# Patient Record
Sex: MALE | Race: WHITE | NOT HISPANIC OR LATINO | Employment: FULL TIME | URBAN - METROPOLITAN AREA
[De-identification: names, ages, dates, MRNs, and addresses within clinical notes are randomized per-mention and may not be internally consistent; named-entity substitution may affect disease eponyms.]

---

## 2018-07-05 NOTE — PROGRESS NOTES
FAMILY MEDICINE NEW PATIENT NOTE  Elvis Barrett 33 y o  male   DATE: July 6, 2018      ASSESSMENT and PLAN:  Elvis Barrett is a 33 y o  male here to establish care with:     Trigger ring finger of right hand  Takes Aleve almost every morning for the pain as soon as he wakes up  Related to using a hand tool  patient states that he does not want a steroid injection or surgery at this time, reviewed signs or symptoms for which to seek urgent care    Attention deficit hyperactivity disorder (ADHD), predominantly inattentive type    Patient states that he has had ADHD since he was a child, but never been on treatment for it because his father never wanted him to be medicated  Patient describes symptoms of inattention mostly  Patient given ADHD adult self report scale to fill out and will review at follow-up   Given that patient also expressed interest in medical marijuana and named Adderall and Ritalin as potential medication choices, will review on line PDMP before  Prescribing stimulant medications    Tobacco use  Discussed with patient the risks of continued smoking  Discussed current use pattern  I advised patient to quit, and offered support  Pt expressed understanding, is pre-contemplative at this time  Asked patient to inform me when he sets a quit date  Offered assistance for whenever he is ready to quit  I spent approximately 5 minutes counseling the patient  Chronic fatigue   Patient describes daily fatigue and lack of energy    Per his girlfriend's recommendation, patient has been doing vitamin B12 supplements unsure if he had a known deficiency in the past     Check routine blood work, CBC, CMP, TSH, vitamin B12    Vitamin B 12 deficiency   Patient unsure if he had vitamin B12 deficiency in the past, is taking oral supplementation, will check CBC and vitamin B12     return to clinic in 2-4 weeks to discuss self report scale results and blood work results    SUBJECTIVE:  Yumiko Francisco Aly Kohler is a 33 y o  male who presents today with a chief complaint of Establish Care  Pt here to establish care  Social History: Fiance with a 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 boy  Recently moved here from [facility]  Now working with a [employer] and service [employer], 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     Acute Concerns:  1  Pt has known ADHD and had to visit ERs multiple times  His dad never wanted him to be on medications  First diagnosed around [age] yoa  Symptoms- inattention, wandering thoughts, multiple tasks  Easily distracted  Had tried an herbal drink for a month but without much benefit  Though he may have needed Adderall  Thinks the symptoms are worse at home, but present at both home at work  2  Fatigue- feels fatigued all day, takes Vit B12 supplements at the recommendation of his fiance  3  Tobacco use- smokes 3/4 ppd, has smoked on and off for 13 years, was able to quit for a 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  Currently rolling his own cigarettes, thinks he may even be down to 1/2ppd  4  Thinks he may have IBS- denies changes in his bowel habits, but says he occ gets abdominal cramping  His friend has a medical marijuana card for IBS and was wondering about that  TDaP in 2009 per pt      Review of Systems   Constitutional: Positive for fatigue  Negative for chills and fever  HENT: Negative for hearing loss  Eyes: Negative for visual disturbance  Respiratory: Negative for cough and shortness of breath  Cardiovascular: Negative for chest pain  Gastrointestinal: Negative for blood in stool, constipation, diarrhea, nausea and vomiting  Musculoskeletal: Positive for arthralgias  Skin: Negative for rash  Allergic/Immunologic: Negative for environmental allergies  Psychiatric/Behavioral: Positive for decreased concentration  I have reviewed the patient's PMH, Surgical History, Family History, Social History, Medication List and Allergies        Histories Reviewed and Updated 7/6/2018:  Patient's Medications   New Prescriptions    No medications on file   Previous Medications    NAPROXEN SODIUM (ALEVE PO)    Take 1 tablet by mouth as needed   Modified Medications    No medications on file   Discontinued Medications    No medications on file     No Known Allergies  Past Medical History:   Diagnosis Date    Trigger finger      Past Surgical History:   Procedure Laterality Date    FINGER SURGERY Left      Social History     Social History    Marital status: Single     Spouse name: N/A    Number of children: N/A    Years of education: N/A     Occupational History    Not on file  Social History Main Topics    Smoking status: Current Every Day Smoker     Packs/day: 0 50     Years: 13 00    Smokeless tobacco: Never Used    Alcohol use Yes      Comment: social    Drug use: No    Sexual activity: Yes     Partners: Female     Birth control/ protection: None      Comment: fiance     Other Topics Concern    Not on file     Social History Narrative    Fiance with a 1year old boy    Recently moved here from [facility]  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[employer], 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      Family History   Problem Relation Age of Onset    ALS Mother     Hypertension Father        There is no immunization history on file for this patient  OBJECTIVE:  /56   Pulse 62   Temp (!) 96 1 °F (35 6 °C)   Resp 16   Ht 5' 9 2" (1 758 m)   Wt 78 kg (172 lb)   BMI 25 25 kg/m²   Physical Exam   Constitutional: He is oriented to person, place, and time  He appears well-developed and well-nourished  No distress  HENT:   Head: Normocephalic and atraumatic  Mouth/Throat: Oropharynx is clear and moist  No oropharyngeal exudate  Eyes: Conjunctivae are normal  Pupils are equal, round, and reactive to light  Right eye exhibits no discharge  Left eye exhibits no discharge  Neck: Normal range of motion  Neck supple     Cardiovascular: Normal rate, regular rhythm and normal heart sounds  Pulmonary/Chest: Effort normal and breath sounds normal  No respiratory distress  He has no wheezes  He has no rales  Abdominal: Soft  Bowel sounds are normal  He exhibits no distension  There is no tenderness  Lymphadenopathy:     He has no cervical adenopathy  Neurological: He is alert and oriented to person, place, and time  Skin: He is not diaphoretic  Multiple tattoos on b/l UE   Vitals reviewed  Pancho Diaz MD

## 2018-07-06 ENCOUNTER — APPOINTMENT (OUTPATIENT)
Dept: LAB | Age: 28
End: 2018-07-06
Payer: COMMERCIAL

## 2018-07-06 ENCOUNTER — OFFICE VISIT (OUTPATIENT)
Dept: FAMILY MEDICINE CLINIC | Facility: CLINIC | Age: 28
End: 2018-07-06
Payer: COMMERCIAL

## 2018-07-06 VITALS
DIASTOLIC BLOOD PRESSURE: 56 MMHG | HEIGHT: 69 IN | RESPIRATION RATE: 16 BRPM | BODY MASS INDEX: 25.48 KG/M2 | TEMPERATURE: 96.1 F | WEIGHT: 172 LBS | SYSTOLIC BLOOD PRESSURE: 102 MMHG | HEART RATE: 62 BPM

## 2018-07-06 DIAGNOSIS — M65.341 TRIGGER RING FINGER OF RIGHT HAND: ICD-10-CM

## 2018-07-06 DIAGNOSIS — R53.82 CHRONIC FATIGUE: ICD-10-CM

## 2018-07-06 DIAGNOSIS — Z76.89 ENCOUNTER TO ESTABLISH CARE WITH NEW DOCTOR: Primary | ICD-10-CM

## 2018-07-06 DIAGNOSIS — E53.8 VITAMIN B 12 DEFICIENCY: ICD-10-CM

## 2018-07-06 DIAGNOSIS — F90.0 ATTENTION DEFICIT HYPERACTIVITY DISORDER (ADHD), PREDOMINANTLY INATTENTIVE TYPE: ICD-10-CM

## 2018-07-06 DIAGNOSIS — Z72.0 TOBACCO USE: ICD-10-CM

## 2018-07-06 LAB
ALBUMIN SERPL BCP-MCNC: 4.5 G/DL (ref 3.5–5)
ALP SERPL-CCNC: 50 U/L (ref 46–116)
ALT SERPL W P-5'-P-CCNC: 26 U/L (ref 12–78)
ANION GAP SERPL CALCULATED.3IONS-SCNC: 6 MMOL/L (ref 4–13)
AST SERPL W P-5'-P-CCNC: 14 U/L (ref 5–45)
BASOPHILS # BLD AUTO: 0.09 THOUSANDS/ΜL (ref 0–0.1)
BASOPHILS NFR BLD AUTO: 1 % (ref 0–1)
BILIRUB SERPL-MCNC: 0.72 MG/DL (ref 0.2–1)
BUN SERPL-MCNC: 17 MG/DL (ref 5–25)
CALCIUM SERPL-MCNC: 9 MG/DL (ref 8.3–10.1)
CHLORIDE SERPL-SCNC: 108 MMOL/L (ref 100–108)
CO2 SERPL-SCNC: 26 MMOL/L (ref 21–32)
CREAT SERPL-MCNC: 0.9 MG/DL (ref 0.6–1.3)
EOSINOPHIL # BLD AUTO: 0.34 THOUSAND/ΜL (ref 0–0.61)
EOSINOPHIL NFR BLD AUTO: 4 % (ref 0–6)
ERYTHROCYTE [DISTWIDTH] IN BLOOD BY AUTOMATED COUNT: 12.1 % (ref 11.6–15.1)
GFR SERPL CREATININE-BSD FRML MDRD: 117 ML/MIN/1.73SQ M
GLUCOSE SERPL-MCNC: 124 MG/DL (ref 65–140)
HCT VFR BLD AUTO: 43 % (ref 36.5–49.3)
HGB BLD-MCNC: 14.5 G/DL (ref 12–17)
IMM GRANULOCYTES # BLD AUTO: 0.04 THOUSAND/UL (ref 0–0.2)
IMM GRANULOCYTES NFR BLD AUTO: 0 % (ref 0–2)
LYMPHOCYTES # BLD AUTO: 2.8 THOUSANDS/ΜL (ref 0.6–4.47)
LYMPHOCYTES NFR BLD AUTO: 28 % (ref 14–44)
MCH RBC QN AUTO: 29.3 PG (ref 26.8–34.3)
MCHC RBC AUTO-ENTMCNC: 33.7 G/DL (ref 31.4–37.4)
MCV RBC AUTO: 87 FL (ref 82–98)
MONOCYTES # BLD AUTO: 1.05 THOUSAND/ΜL (ref 0.17–1.22)
MONOCYTES NFR BLD AUTO: 11 % (ref 4–12)
NEUTROPHILS # BLD AUTO: 5.53 THOUSANDS/ΜL (ref 1.85–7.62)
NEUTS SEG NFR BLD AUTO: 56 % (ref 43–75)
NRBC BLD AUTO-RTO: 0 /100 WBCS
PLATELET # BLD AUTO: 241 THOUSANDS/UL (ref 149–390)
PMV BLD AUTO: 9.9 FL (ref 8.9–12.7)
POTASSIUM SERPL-SCNC: 3.7 MMOL/L (ref 3.5–5.3)
PROT SERPL-MCNC: 7.4 G/DL (ref 6.4–8.2)
RBC # BLD AUTO: 4.95 MILLION/UL (ref 3.88–5.62)
SODIUM SERPL-SCNC: 140 MMOL/L (ref 136–145)
TSH SERPL DL<=0.05 MIU/L-ACNC: 2.42 UIU/ML (ref 0.36–3.74)
VIT B12 SERPL-MCNC: 793 PG/ML (ref 100–900)
WBC # BLD AUTO: 9.85 THOUSAND/UL (ref 4.31–10.16)

## 2018-07-06 PROCEDURE — 84443 ASSAY THYROID STIM HORMONE: CPT | Performed by: FAMILY MEDICINE

## 2018-07-06 PROCEDURE — 99406 BEHAV CHNG SMOKING 3-10 MIN: CPT | Performed by: FAMILY MEDICINE

## 2018-07-06 PROCEDURE — 36415 COLL VENOUS BLD VENIPUNCTURE: CPT | Performed by: FAMILY MEDICINE

## 2018-07-06 PROCEDURE — 80053 COMPREHEN METABOLIC PANEL: CPT | Performed by: FAMILY MEDICINE

## 2018-07-06 PROCEDURE — 82607 VITAMIN B-12: CPT

## 2018-07-06 PROCEDURE — 99204 OFFICE O/P NEW MOD 45 MIN: CPT | Performed by: FAMILY MEDICINE

## 2018-07-06 PROCEDURE — 85025 COMPLETE CBC W/AUTO DIFF WBC: CPT | Performed by: FAMILY MEDICINE

## 2018-07-06 NOTE — ASSESSMENT & PLAN NOTE
Patient states that he has had ADHD since he was a child, but never been on treatment for it because his father never wanted him to be medicated      Patient describes symptoms of inattention mostly  Patient given ADHD adult self report scale to fill out and will review at follow-up   Given that patient also expressed interest in medical marijuana and named Adderall and Ritalin as potential medication choices, will review on line PDMP before  Prescribing stimulant medications

## 2018-07-06 NOTE — ASSESSMENT & PLAN NOTE
Patient describes daily fatigue and lack of energy    Per his girlfriend's recommendation, patient has been doing vitamin B12 supplements unsure if he had a known deficiency in the past     Check routine blood work, CBC, CMP, TSH, vitamin B12

## 2018-07-06 NOTE — ASSESSMENT & PLAN NOTE
Patient unsure if he had vitamin B12 deficiency in the past, is taking oral supplementation, will check CBC and vitamin B12

## 2018-07-06 NOTE — ASSESSMENT & PLAN NOTE
Takes Aleve almost every morning for the pain as soon as he wakes up  Related to using a hand tool      patient states that he does not want a steroid injection or surgery at this time, reviewed signs or symptoms for which to seek urgent care

## 2018-07-06 NOTE — ASSESSMENT & PLAN NOTE
Discussed with patient the risks of continued smoking  Discussed current use pattern  I advised patient to quit, and offered support  Pt expressed understanding, is pre-contemplative at this time  Asked patient to inform me when he sets a quit date  Offered assistance for whenever he is ready to quit  I spent approximately 5 minutes counseling the patient

## 2018-07-19 ENCOUNTER — OFFICE VISIT (OUTPATIENT)
Dept: FAMILY MEDICINE CLINIC | Facility: CLINIC | Age: 28
End: 2018-07-19
Payer: COMMERCIAL

## 2018-07-19 ENCOUNTER — TELEPHONE (OUTPATIENT)
Dept: FAMILY MEDICINE CLINIC | Facility: CLINIC | Age: 28
End: 2018-07-19

## 2018-07-19 VITALS
BODY MASS INDEX: 25.98 KG/M2 | SYSTOLIC BLOOD PRESSURE: 120 MMHG | HEIGHT: 69 IN | HEART RATE: 66 BPM | TEMPERATURE: 97.6 F | DIASTOLIC BLOOD PRESSURE: 70 MMHG | WEIGHT: 175.4 LBS | RESPIRATION RATE: 16 BRPM

## 2018-07-19 DIAGNOSIS — M54.32 SCIATICA OF LEFT SIDE: ICD-10-CM

## 2018-07-19 DIAGNOSIS — F90.2 ATTENTION DEFICIT HYPERACTIVITY DISORDER (ADHD), COMBINED TYPE: Primary | ICD-10-CM

## 2018-07-19 DIAGNOSIS — R53.82 CHRONIC FATIGUE: ICD-10-CM

## 2018-07-19 PROBLEM — E53.8 VITAMIN B 12 DEFICIENCY: Status: RESOLVED | Noted: 2018-07-06 | Resolved: 2018-07-19

## 2018-07-19 PROCEDURE — 3008F BODY MASS INDEX DOCD: CPT | Performed by: FAMILY MEDICINE

## 2018-07-19 PROCEDURE — 99214 OFFICE O/P EST MOD 30 MIN: CPT | Performed by: FAMILY MEDICINE

## 2018-07-19 PROCEDURE — 4004F PT TOBACCO SCREEN RCVD TLK: CPT | Performed by: FAMILY MEDICINE

## 2018-07-19 RX ORDER — ACETAMINOPHEN 500 MG
1000 TABLET ORAL EVERY 6 HOURS PRN
Qty: 30 TABLET | Refills: 0 | Status: SHIPPED | OUTPATIENT
Start: 2018-07-19

## 2018-07-19 RX ORDER — ATOMOXETINE 40 MG/1
40 CAPSULE ORAL DAILY
Qty: 30 CAPSULE | Refills: 1 | Status: SHIPPED | OUTPATIENT
Start: 2018-07-19

## 2018-07-19 RX ORDER — NAPROXEN 500 MG/1
500 TABLET ORAL 2 TIMES DAILY WITH MEALS
Qty: 20 TABLET | Refills: 0 | Status: SHIPPED | OUTPATIENT
Start: 2018-07-19 | End: 2018-07-29

## 2018-07-19 NOTE — ASSESSMENT & PLAN NOTE
Patient has an adult ADHD self-report Scale score of 55, it is very high, patient was very reluctant to score any symptoms negatively, though may indeed have these symptoms  Patient does seem to have some inattentive behaviors, but I do think he may be exaggerating some symptoms  We discussed treating ADHD with psychological therapy and medications, states that he does not like therapy  When discussing medications, talked about doing Strattera given that other medications were stimulants and could potentially exacerbate anxiety issues  Patient asked if he could request Adderall specifically  This is a red flag for controlled substances     -Start Strattera 40 mg daily for 2 weeks   -Call with update in 2 weeks,   -Return to clinic in 4 weeks for re-evaluation  - referral to psych to determine if it is indeed ADHD verses drug-seeking behavior   was queried and patient has had no fills for controlled substances

## 2018-07-19 NOTE — PROGRESS NOTES
FAMILY MEDICINE PROGRESS NOTE  Macy Bull 33 y o  male   DATE: July 19, 2018     ASSESSMENT and PLAN:  Macy Bull is a 33 y o  male with:     Attention deficit hyperactivity disorder (ADHD), combined type  Patient has an adult ADHD self-report Scale score of 55, it is very high, patient was very reluctant to score any symptoms negatively, though may indeed have these symptoms  Patient does seem to have some inattentive behaviors, but I do think he may be exaggerating some symptoms  We discussed treating ADHD with psychological therapy and medications, states that he does not like therapy  When discussing medications, talked about doing Strattera given that other medications were stimulants and could potentially exacerbate anxiety issues  Patient asked if he could request Adderall specifically  This is a red flag for controlled substances     -Start Strattera 40 mg daily for 2 weeks   -Call with update in 2 weeks,   -Return to clinic in 4 weeks for re-evaluation  - referral to psych to determine if it is indeed ADHD verses drug-seeking behavior   was queried and patient has had no fills for controlled substances  Chronic fatigue  Lab work up including CBC, CMP, B12, TSH were WNL    Sciatica of left side  At the end of the visit, the pt requested if he could get a "pain reliever " Symptoms not reproducible on exam, states that his LBP has been present for years and had negative back X-rays a few years ago  Sx c/w sciatica  Advised that he is doing NSAIDs at too high of a dose  -Tylenol PRN  -Naproxen 500mg sparingly  -Advised the narcotic pain meds are not indicated and he should treat it with exercise and PT      SUBJECTIVE:  Macy Bull is a 33 y o  male who presents today with a chief complaint of No chief complaint on file  Pt here for follow up and lab review     Pt had complaints of fatigue and ADHD symptoms, never been on meds in the past   B12, CMP, CBC, TSH were all WNL  ADHD:  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 between them  It is hard to stay focused on something and then losing track  He says its worse at home and can't remember what she asked  He was given and ADHD Self Report Scale that he forgot to bring back today  When he was younger it was more of the bouncing of the wall, now more inattention  He tried his friend's Adderall in the past and it helped a little bit  Denies mood disorders, depressive symptoms, no SI/HI  Occ gets anxiety with not being able to perform at work  Had to see a therapist when his mom   Adult ADHD Self Report Scale:  1  4  2  4  3  3  4  3  5  3  6  3  7  4  8  4  9  3  Part A Total= 31    10  3  11  2  12  3  13  4  14  2  15  2  16  3  17  3  18  2  Part B Total = 24     As the visit is ending pt is also requesting "pain meds" that are stronger than Aleve  He has been doing 1-3 Aleve a few times a day with Ibuprofen for pain relief of chronic low back pain and right hand pain in the morning  He says that he has had pain for >10 years, had normal X-rays 4 years ago  He has tingling at times in his left leg, but no numbness  Review of Systems   Constitutional: Positive for fatigue  Musculoskeletal: Positive for arthralgias and back pain  Negative for gait problem and neck pain  Psychiatric/Behavioral: Positive for agitation and decreased concentration  Negative for dysphoric mood, sleep disturbance and suicidal ideas  The patient is nervous/anxious and is hyperactive  I have reviewed the patient's PMH, Social History, Medication List and Allergies as appropriate  OBJECTIVE:  /70   Pulse 66   Temp 97 6 °F (36 4 °C)   Resp 16   Ht 5' 9" (1 753 m)   Wt 79 6 kg (175 lb 6 4 oz)   BMI 25 90 kg/m²    Physical Exam   Constitutional: He appears well-developed and well-nourished  He appears distressed     Musculoskeletal:        Lumbar back: Normal  He exhibits normal range of motion, no tenderness, no bony tenderness, no swelling, no edema, no deformity, no laceration, no pain and no spasm  Negative SLR bilaterally   Skin: He is not diaphoretic  Psychiatric: He has a normal mood and affect   His speech is normal and behavior is normal  Thought content normal  Cognition and memory are normal        Appointment on 07/06/2018   Component Date Value Ref Range Status    Vitamin B-12 07/06/2018 793  100 - 900 pg/mL Final   Office Visit on 07/06/2018   Component Date Value Ref Range Status    WBC 07/06/2018 9 85  4 31 - 10 16 Thousand/uL Final    RBC 07/06/2018 4 95  3 88 - 5 62 Million/uL Final    Hemoglobin 07/06/2018 14 5  12 0 - 17 0 g/dL Final    Hematocrit 07/06/2018 43 0  36 5 - 49 3 % Final    MCV 07/06/2018 87  82 - 98 fL Final    MCH 07/06/2018 29 3  26 8 - 34 3 pg Final    MCHC 07/06/2018 33 7  31 4 - 37 4 g/dL Final    RDW 07/06/2018 12 1  11 6 - 15 1 % Final    MPV 07/06/2018 9 9  8 9 - 12 7 fL Final    Platelets 16/90/2600 241  149 - 390 Thousands/uL Final    nRBC 07/06/2018 0  /100 WBCs Final    Neutrophils Relative 07/06/2018 56  43 - 75 % Final    Immat GRANS % 07/06/2018 0  0 - 2 % Final    Lymphocytes Relative 07/06/2018 28  14 - 44 % Final    Monocytes Relative 07/06/2018 11  4 - 12 % Final    Eosinophils Relative 07/06/2018 4  0 - 6 % Final    Basophils Relative 07/06/2018 1  0 - 1 % Final    Neutrophils Absolute 07/06/2018 5 53  1 85 - 7 62 Thousands/µL Final    Immature Grans Absolute 07/06/2018 0 04  0 00 - 0 20 Thousand/uL Final    Lymphocytes Absolute 07/06/2018 2 80  0 60 - 4 47 Thousands/µL Final    Monocytes Absolute 07/06/2018 1 05  0 17 - 1 22 Thousand/µL Final    Eosinophils Absolute 07/06/2018 0 34  0 00 - 0 61 Thousand/µL Final    Basophils Absolute 07/06/2018 0 09  0 00 - 0 10 Thousands/µL Final    Sodium 07/06/2018 140  136 - 145 mmol/L Final    Potassium 07/06/2018 3 7  3 5 - 5 3 mmol/L Final    Chloride 07/06/2018 108  100 - 108 mmol/L Final    CO2 07/06/2018 26  21 - 32 mmol/L Final    Anion Gap 07/06/2018 6  4 - 13 mmol/L Final    BUN 07/06/2018 17  5 - 25 mg/dL Final    Creatinine 07/06/2018 0 90  0 60 - 1 30 mg/dL Final    Standardized to IDMS reference method    Glucose 07/06/2018 124  65 - 140 mg/dL Final      If the patient is fasting, the ADA then defines impaired fasting glucose as > 100 mg/dL and diabetes as > or equal to 123 mg/dL  Specimen collection should occur prior to Sulfasalazine administration due to the potential for falsely depressed results  Specimen collection should occur prior to Sulfapyridine administration due to the potential for falsely elevated results   Calcium 07/06/2018 9 0  8 3 - 10 1 mg/dL Final    AST 07/06/2018 14  5 - 45 U/L Final      Specimen collection should occur prior to Sulfasalazine administration due to the potential for falsely depressed results   ALT 07/06/2018 26  12 - 78 U/L Final      Specimen collection should occur prior to Sulfasalazine and/or Sulfapyridine administration due to the potential for falsely depressed results   Alkaline Phosphatase 07/06/2018 50  46 - 116 U/L Final    Total Protein 07/06/2018 7 4  6 4 - 8 2 g/dL Final    Albumin 07/06/2018 4 5  3 5 - 5 0 g/dL Final    Total Bilirubin 07/06/2018 0 72  0 20 - 1 00 mg/dL Final    eGFR 07/06/2018 117  ml/min/1 73sq m Final    TSH 3RD GENERATON 07/06/2018 2 420  0 358 - 3 740 uIU/mL Final       [facility] M  Mack Diaz MD    Note: Portions of the record may have been created with voice recognition software  Occasional wrong word or "sound a like" substitutions may have occurred due to the inherent limitations of voice recognition software  Read the chart carefully and recognize, using context, where substitutions have occurred

## 2018-07-19 NOTE — TELEPHONE ENCOUNTER
Pt was just in with you  He went to the pharmacy and the prescription for Atomoxetine was $108 00  He didn't know it was going to be that expensive  He did get it this time but he stated he will not be able to pay for  this monthly  Is there another alternative for him? Pt asked to speak to nurse or Dr Drea Cornejo    [phone]

## 2018-07-19 NOTE — ASSESSMENT & PLAN NOTE
At the end of the visit, the pt requested if he could get a "pain reliever " Symptoms not reproducible on exam, states that his LBP has been present for years and had negative back X-rays a few years ago  Sx c/w sciatica  Advised that he is doing NSAIDs at too high of a dose    -Tylenol PRN  -Naproxen 500mg sparingly  -Advised the narcotic pain meds are not indicated and he should treat it with exercise and PT

## 2018-07-20 NOTE — TELEPHONE ENCOUNTER
Two other options are:  Guanfacine extended-release or Clonidine extended-release  He can call his insurance company before our f/u in 1 month to see what his copay is going to be  Unfortunately they determine what the cost is and I can't tell what that is going to be     Thanks

## 2018-07-23 NOTE — TELEPHONE ENCOUNTER
I called pt  He stated he did pay for the Atomoxetine  He will call back in about 2 weeks to let you know how the prescription is working for him